# Patient Record
Sex: MALE | Race: WHITE | NOT HISPANIC OR LATINO | Employment: FULL TIME | URBAN - METROPOLITAN AREA
[De-identification: names, ages, dates, MRNs, and addresses within clinical notes are randomized per-mention and may not be internally consistent; named-entity substitution may affect disease eponyms.]

---

## 2023-10-17 PROCEDURE — 80061 LIPID PANEL: CPT

## 2023-10-17 PROCEDURE — 84153 ASSAY OF PSA TOTAL: CPT

## 2023-10-17 PROCEDURE — 81001 URINALYSIS AUTO W/SCOPE: CPT

## 2023-10-17 PROCEDURE — 87086 URINE CULTURE/COLONY COUNT: CPT

## 2023-10-17 PROCEDURE — 84443 ASSAY THYROID STIM HORMONE: CPT

## 2023-10-17 PROCEDURE — 80053 COMPREHEN METABOLIC PANEL: CPT

## 2023-10-17 PROCEDURE — 82306 VITAMIN D 25 HYDROXY: CPT

## 2023-10-17 PROCEDURE — 85027 COMPLETE CBC AUTOMATED: CPT

## 2023-10-17 PROCEDURE — 84550 ASSAY OF BLOOD/URIC ACID: CPT

## 2023-10-17 PROCEDURE — 84439 ASSAY OF FREE THYROXINE: CPT

## 2024-01-04 DIAGNOSIS — J06.9 UPPER RESPIRATORY TRACT INFECTION, UNSPECIFIED TYPE: Primary | ICD-10-CM

## 2024-01-04 RX ORDER — SULFAMETHOXAZOLE AND TRIMETHOPRIM 800; 160 MG/1; MG/1
1 TABLET ORAL 2 TIMES DAILY
Qty: 20 TABLET | Refills: 0 | Status: SHIPPED | OUTPATIENT
Start: 2024-01-04 | End: 2024-01-14

## 2024-01-19 ENCOUNTER — HOSPITAL ENCOUNTER (OUTPATIENT)
Dept: RADIOLOGY | Facility: CLINIC | Age: 64
Discharge: HOME | End: 2024-01-19
Payer: COMMERCIAL

## 2024-01-19 DIAGNOSIS — M54.2 NECK PAIN: ICD-10-CM

## 2024-01-19 PROCEDURE — 72050 X-RAY EXAM NECK SPINE 4/5VWS: CPT | Performed by: RADIOLOGY

## 2024-01-19 PROCEDURE — 72050 X-RAY EXAM NECK SPINE 4/5VWS: CPT

## 2024-05-07 DIAGNOSIS — G47.9 SLEEP DIFFICULTIES: Primary | ICD-10-CM

## 2024-05-07 RX ORDER — ZOLPIDEM TARTRATE 10 MG/1
10 TABLET ORAL NIGHTLY PRN
Qty: 90 TABLET | Refills: 1 | Status: SHIPPED | OUTPATIENT
Start: 2024-05-07

## 2024-05-07 RX ORDER — ZOLPIDEM TARTRATE 10 MG/1
10 TABLET ORAL NIGHTLY PRN
COMMUNITY
End: 2024-05-07 | Stop reason: SDUPTHER

## 2024-05-07 RX ORDER — MELOXICAM 15 MG/1
15 TABLET ORAL DAILY
COMMUNITY

## 2024-05-24 ENCOUNTER — OFFICE VISIT (OUTPATIENT)
Dept: ORTHOPEDIC SURGERY | Facility: CLINIC | Age: 64
End: 2024-05-24
Payer: COMMERCIAL

## 2024-05-24 ENCOUNTER — HOSPITAL ENCOUNTER (OUTPATIENT)
Dept: RADIOLOGY | Facility: CLINIC | Age: 64
Discharge: HOME | End: 2024-05-24
Payer: COMMERCIAL

## 2024-05-24 VITALS — WEIGHT: 223.2 LBS | BODY MASS INDEX: 33.06 KG/M2 | HEIGHT: 69 IN

## 2024-05-24 DIAGNOSIS — G89.29 CHRONIC KNEE PAIN AFTER TOTAL REPLACEMENT OF RIGHT KNEE JOINT: Primary | ICD-10-CM

## 2024-05-24 DIAGNOSIS — M25.561 PAIN AND SWELLING OF RIGHT KNEE: ICD-10-CM

## 2024-05-24 DIAGNOSIS — Z96.651 CHRONIC KNEE PAIN AFTER TOTAL REPLACEMENT OF RIGHT KNEE JOINT: Primary | ICD-10-CM

## 2024-05-24 DIAGNOSIS — M25.561 CHRONIC KNEE PAIN AFTER TOTAL REPLACEMENT OF RIGHT KNEE JOINT: Primary | ICD-10-CM

## 2024-05-24 DIAGNOSIS — Z98.890: ICD-10-CM

## 2024-05-24 DIAGNOSIS — M79.2 NERVE PAIN: ICD-10-CM

## 2024-05-24 DIAGNOSIS — M25.461 PAIN AND SWELLING OF RIGHT KNEE: ICD-10-CM

## 2024-05-24 DIAGNOSIS — M47.812 CERVICAL ARTHRITIS: ICD-10-CM

## 2024-05-24 PROCEDURE — 73562 X-RAY EXAM OF KNEE 3: CPT | Mod: RIGHT SIDE | Performed by: RADIOLOGY

## 2024-05-24 PROCEDURE — 99213 OFFICE O/P EST LOW 20 MIN: CPT | Performed by: ORTHOPAEDIC SURGERY

## 2024-05-24 PROCEDURE — 73562 X-RAY EXAM OF KNEE 3: CPT | Mod: RT

## 2024-05-24 ASSESSMENT — PAIN - FUNCTIONAL ASSESSMENT: PAIN_FUNCTIONAL_ASSESSMENT: 0-10

## 2024-05-24 ASSESSMENT — PAIN SCALES - GENERAL: PAINLEVEL_OUTOF10: 8

## 2024-05-24 NOTE — PROGRESS NOTES
Patient is a 62-year-old gentleman who presents today for evaluation of his right revision total knee replacement.  His surgery was performed at an outside institution.  He originally had his knee replacement and subsequently developed a periprosthetic infection and was treated with a two-stage revision protocol.  He feels like the knee gives out at times and he has constant pain.    Right knee:  AAOx3, NAD, walks with a non-antalgic gait  Neutral allignment  Range of motion 5-100  Stable to varus/valgus/anterior/posterior stress through out the range of motion  No no joint line tenderness to palpation  No effusion  SILT in a [place]/saph/per/tib distribution  5/5 knee extension/df/pf/ehl  ½ dorsalis pedis and posterior tibial pulse  no popliteal lymphadenopathy  no other overlying lesions  mood: euthymic  Respirations non labored  Incision healed    Plain films were read by myself in clinic today.  The stem of revision knee replacement in place with adequate alignment and no signs of loosening or failure.    I discussed today with the patient that there is really nothing I can do further surgically this time.  His clinical exam is actually very benign despite how he feels.  We did put a referral in for pain management to evaluate him for some neck injections as well as possible nerve ablation around his knee.  All of his questions were answered.  He can follow-up with me as needed.    This note was created using voice recognition software and was not corrected for typographical or grammatical errors.

## 2024-06-13 ENCOUNTER — OFFICE VISIT (OUTPATIENT)
Dept: PAIN MEDICINE | Facility: HOSPITAL | Age: 64
End: 2024-06-13
Payer: COMMERCIAL

## 2024-06-13 DIAGNOSIS — M79.2 NERVE PAIN: ICD-10-CM

## 2024-06-13 DIAGNOSIS — Z96.651 CHRONIC KNEE PAIN AFTER TOTAL REPLACEMENT OF RIGHT KNEE JOINT: ICD-10-CM

## 2024-06-13 DIAGNOSIS — M25.561 PAIN AND SWELLING OF RIGHT KNEE: ICD-10-CM

## 2024-06-13 DIAGNOSIS — M54.12 CERVICAL RADICULITIS: ICD-10-CM

## 2024-06-13 DIAGNOSIS — G89.29 CHRONIC KNEE PAIN AFTER TOTAL REPLACEMENT OF RIGHT KNEE JOINT: ICD-10-CM

## 2024-06-13 DIAGNOSIS — M47.812 CERVICAL ARTHRITIS: ICD-10-CM

## 2024-06-13 DIAGNOSIS — M25.561 CHRONIC KNEE PAIN AFTER TOTAL REPLACEMENT OF RIGHT KNEE JOINT: ICD-10-CM

## 2024-06-13 DIAGNOSIS — M25.461 PAIN AND SWELLING OF RIGHT KNEE: ICD-10-CM

## 2024-06-13 PROCEDURE — 99214 OFFICE O/P EST MOD 30 MIN: CPT | Performed by: ANESTHESIOLOGY

## 2024-06-13 ASSESSMENT — PAIN SCALES - GENERAL: PAINLEVEL: 6

## 2024-06-13 NOTE — PROGRESS NOTES
Chief Complaint   Patient presents with    Knee Pain    Neck Pain     History Of Present Illness  Efrain Rios is a 64 y.o. male presenting as a remote follow-up for left-sided neck pain and right knee pain.  Patient says his right knee pain has been much more chronic, right TKA that was complicated by infections and two-stage revision.  He also has been complaining of new left neck pain, that has severely limited his ability to rotate his head to the left or right, which exacerbates his pain.  He denies any radicular symptoms beyond the shoulder blade.  He has no pain in the lower arm or fingertips as well as any paresthesia.     Past Medical History  Past Medical History:   Diagnosis Date    Abnormal findings on diagnostic imaging of heart and coronary circulation     Elevated coronary artery calcium score    Other conditions influencing health status 12/10/2018    Colonoscopy planned    Personal history of other diseases of the respiratory system 09/26/2016    History of acute sinusitis       Surgical History  Past Surgical History:   Procedure Laterality Date    KNEE ARTHROSCOPY W/ DEBRIDEMENT  10/23/2013    Arthroscopy Knee Right    TOTAL KNEE ARTHROPLASTY  10/23/2013    Total Knee Arthroplasty        Social History  He has no history on file for tobacco use, alcohol use, and drug use.    Family History  No family history on file.     Allergies  Patient has no allergy information on record.    Review of Systems   13 point ROS done and negative except for the above.   Physical Exam    PHYSICAL EXAM  Vitals signs reviewed  Constitutional:    General: Not in acute distress   Appearance: Normal appearance. Not ill-appearing.  HENT:   Head: Normocephalic and atraumatic  Eyes:   Conjunctiva/sclera normal  Cardiovascular:  No jugular venous distention bilaterally  No gross edema in lower extremities  Pulmonary:   Effort: No respiratory distress  Abdominal:  Abdomen appears nondistended  Musculoskeletal:   Point of  maximal tenderness of the neck over left cervical paraspinal region  Skin:  Well-healed surgical incision over bilateral knees  Neurological:   General: No focal deficit present  Psychiatric:    Mood and Affect: Mood normal    Behavior: Behavior normal    Last Recorded Vitals  There were no vitals taken for this visit.    Relevant Results        ASSESSMENT:  Assessment/Plan   Problem List Items Addressed This Visit    None  Visit Diagnoses         Codes    Pain and swelling of right knee     M25.561, M25.461    Chronic knee pain after total replacement of right knee joint     M25.561, G89.29, Z96.651    Cervical arthritis     M47.812    Nerve pain     M79.2            Patient is a 62-year-old male who presents as a remote follow-up visit now complaining of left-sided neck pain and right-sided knee pain. C-spine plain film reviewed, notable for multiple osteophyte complexes that appear to be the worst at C5/C6.  MRI from OSH uploaded.  Patient reports that his neck pain has been more acute more bothersome than his knee.  Patient completed 8 weeks of physical therapy and is active in daily at home physician directed exercises.  Has also failed Tylenol and ibuprofen as well as other conservative medications.  Therefore, we will schedule for left-sided cervical medial branch test block, his first.      PLAN:  -Schedule for left-sided bias cervical epidural steroid injection.  Risks, benefits and alternatives of the procedure were discussed with the patient who expressed understanding and agrees to proceed.    -In the future we discussed the potential for a genicular block and subsequent radiofrequency ablation.  Procedure, risk, benefits, alternatives reviewed.    The plan was discussed with the patient and they were invited to contact us back anytime with any questions or concerns and follow-up with us in the office as needed.    Sky Faria MD

## 2024-06-25 ENCOUNTER — APPOINTMENT (OUTPATIENT)
Dept: PAIN MEDICINE | Facility: HOSPITAL | Age: 64
End: 2024-06-25
Payer: COMMERCIAL

## 2024-06-28 ENCOUNTER — HOSPITAL ENCOUNTER (OUTPATIENT)
Dept: RADIOLOGY | Facility: HOSPITAL | Age: 64
Discharge: HOME | End: 2024-06-28
Payer: COMMERCIAL

## 2024-06-28 VITALS
OXYGEN SATURATION: 99 % | DIASTOLIC BLOOD PRESSURE: 91 MMHG | RESPIRATION RATE: 17 BRPM | HEART RATE: 87 BPM | HEIGHT: 70 IN | TEMPERATURE: 98.1 F | WEIGHT: 220 LBS | SYSTOLIC BLOOD PRESSURE: 134 MMHG | BODY MASS INDEX: 31.5 KG/M2

## 2024-06-28 DIAGNOSIS — M54.12 CERVICAL RADICULITIS: ICD-10-CM

## 2024-06-28 DIAGNOSIS — M47.812 CERVICAL ARTHRITIS: ICD-10-CM

## 2024-06-28 PROCEDURE — 62321 NJX INTERLAMINAR CRV/THRC: CPT | Performed by: ANESTHESIOLOGY

## 2024-06-28 PROCEDURE — 2500000004 HC RX 250 GENERAL PHARMACY W/ HCPCS (ALT 636 FOR OP/ED): Performed by: ANESTHESIOLOGY

## 2024-06-28 RX ORDER — MIDAZOLAM HYDROCHLORIDE 1 MG/ML
INJECTION, SOLUTION INTRAMUSCULAR; INTRAVENOUS
Status: COMPLETED | OUTPATIENT
Start: 2024-06-28 | End: 2024-06-28

## 2024-06-28 ASSESSMENT — PAIN - FUNCTIONAL ASSESSMENT
PAIN_FUNCTIONAL_ASSESSMENT: 0-10

## 2024-06-28 ASSESSMENT — PAIN SCALES - GENERAL
PAINLEVEL_OUTOF10: 0 - NO PAIN
PAINLEVEL_OUTOF10: 7
PAINLEVEL_OUTOF10: 6
PAINLEVEL_OUTOF10: 3
PAINLEVEL_OUTOF10: 7

## 2024-06-28 ASSESSMENT — COLUMBIA-SUICIDE SEVERITY RATING SCALE - C-SSRS
1. IN THE PAST MONTH, HAVE YOU WISHED YOU WERE DEAD OR WISHED YOU COULD GO TO SLEEP AND NOT WAKE UP?: NO
2. HAVE YOU ACTUALLY HAD ANY THOUGHTS OF KILLING YOURSELF?: NO
6. HAVE YOU EVER DONE ANYTHING, STARTED TO DO ANYTHING, OR PREPARED TO DO ANYTHING TO END YOUR LIFE?: NO

## 2024-06-28 NOTE — H&P
"HISTORY AND PHYSICAL    History Of Present Illness  Efrain Rios is a 64 y.o. male presenting with chronic pain.  Here for Cervical interlaminar epidural steroid injection    he denies any recent antibiotic use or infections, he denies any blood thinner use , and he denies contrast or local anesthetic allergies     He did take 81mg aspirin yesterday    Past Medical History  Past Medical History:   Diagnosis Date    Abnormal findings on diagnostic imaging of heart and coronary circulation     Elevated coronary artery calcium score    Other conditions influencing health status 12/10/2018    Colonoscopy planned    Personal history of other diseases of the respiratory system 09/26/2016    History of acute sinusitis       Surgical History  Past Surgical History:   Procedure Laterality Date    KNEE ARTHROSCOPY W/ DEBRIDEMENT  10/23/2013    Arthroscopy Knee Right    TOTAL KNEE ARTHROPLASTY  10/23/2013    Total Knee Arthroplasty        Social History  He reports that he has never smoked. He does not have any smokeless tobacco history on file. He reports current alcohol use. He reports current drug use. Drug: Marijuana.    Family History  No family history on file.     Allergies  Patient has no known allergies.    Review of Systems   12 point ROS done and negative except for the above.   Physical Exam     General: NAD, well groomed, well nourished  Eyes: Non-icteric sclera, EOMI  Ears, Nose, Mouth, and Throat: External ears and nose appear to be without deformity or rash. No lesions or masses noted. Hearing is grossly intact.   Neck: Trachea midline  Respiratory: Nonlabored breathing   Cardiovascular: No peripheral edema   Skin: No rashes or open lesions/ulcers identified on skin.    Last Recorded Vitals  Blood pressure 145/85, pulse 74, temperature 36.8 °C (98.2 °F), temperature source Temporal, resp. rate 19, height 1.778 m (5' 10\"), weight 99.8 kg (220 lb), SpO2 98%.    Relevant Results           Assessment/Plan "   Discussed the increased risk of bleeding complications like epidural hematoma with recent aspirin use. Offered to reschedule or proceed with increased risk. Had a shared discussion about the risks and all parties agreed to proceed.     Risks, benefits, alternatives discussed. All questions answered to the best of my ability. Patient agrees to proceed.   -We will proceed with planned procedure        Jah Ya MD  Chronic Pain Fellow  [facility]

## 2024-07-22 ENCOUNTER — LAB REQUISITION (OUTPATIENT)
Dept: LAB | Facility: LAB | Age: 64
End: 2024-07-22
Payer: COMMERCIAL

## 2024-07-22 ENCOUNTER — HOSPITAL ENCOUNTER (OUTPATIENT)
Dept: RADIOLOGY | Facility: CLINIC | Age: 64
Discharge: HOME | End: 2024-07-22
Payer: COMMERCIAL

## 2024-07-22 DIAGNOSIS — M25.551 PAIN OF RIGHT HIP: ICD-10-CM

## 2024-07-22 DIAGNOSIS — E29.1 TESTICULAR HYPOFUNCTION: ICD-10-CM

## 2024-07-22 LAB — TESTOST SERPL-MCNC: 365 NG/DL (ref 240–1000)

## 2024-07-22 PROCEDURE — 84403 ASSAY OF TOTAL TESTOSTERONE: CPT

## 2024-07-22 PROCEDURE — 73502 X-RAY EXAM HIP UNI 2-3 VIEWS: CPT | Mod: RIGHT SIDE | Performed by: RADIOLOGY

## 2024-07-22 PROCEDURE — 73502 X-RAY EXAM HIP UNI 2-3 VIEWS: CPT | Mod: RT

## 2024-07-25 ENCOUNTER — APPOINTMENT (OUTPATIENT)
Dept: ORTHOPEDIC SURGERY | Facility: CLINIC | Age: 64
End: 2024-07-25
Payer: COMMERCIAL

## 2024-08-01 ENCOUNTER — APPOINTMENT (OUTPATIENT)
Dept: ORTHOPEDIC SURGERY | Facility: CLINIC | Age: 64
End: 2024-08-01
Payer: COMMERCIAL

## 2024-08-15 DIAGNOSIS — J40 BRONCHITIS: Primary | ICD-10-CM

## 2024-08-15 RX ORDER — AZITHROMYCIN 250 MG/1
TABLET, FILM COATED ORAL
Qty: 6 TABLET | Refills: 1 | Status: SHIPPED | OUTPATIENT
Start: 2024-08-15 | End: 2024-08-20

## 2024-08-19 ENCOUNTER — OFFICE VISIT (OUTPATIENT)
Dept: ORTHOPEDIC SURGERY | Facility: CLINIC | Age: 64
End: 2024-08-19
Payer: COMMERCIAL

## 2024-08-19 ENCOUNTER — HOSPITAL ENCOUNTER (OUTPATIENT)
Dept: RADIOLOGY | Facility: EXTERNAL LOCATION | Age: 64
Discharge: HOME | End: 2024-08-19

## 2024-08-19 ENCOUNTER — APPOINTMENT (OUTPATIENT)
Dept: ORTHOPEDIC SURGERY | Facility: CLINIC | Age: 64
End: 2024-08-19
Payer: COMMERCIAL

## 2024-08-19 DIAGNOSIS — M16.11 PRIMARY OSTEOARTHRITIS OF RIGHT HIP: Primary | ICD-10-CM

## 2024-08-19 PROCEDURE — 99214 OFFICE O/P EST MOD 30 MIN: CPT | Performed by: EMERGENCY MEDICINE

## 2024-08-19 PROCEDURE — 20611 DRAIN/INJ JOINT/BURSA W/US: CPT | Performed by: EMERGENCY MEDICINE

## 2024-08-19 RX ORDER — LIDOCAINE HYDROCHLORIDE 10 MG/ML
4 INJECTION INFILTRATION; PERINEURAL
Status: COMPLETED | OUTPATIENT
Start: 2024-08-19 | End: 2024-08-19

## 2024-08-19 RX ORDER — METHYLPREDNISOLONE ACETATE 40 MG/ML
80 INJECTION, SUSPENSION INTRA-ARTICULAR; INTRALESIONAL; INTRAMUSCULAR; SOFT TISSUE
Status: COMPLETED | OUTPATIENT
Start: 2024-08-19 | End: 2024-08-19

## 2024-08-19 ASSESSMENT — PAIN DESCRIPTION - DESCRIPTORS: DESCRIPTORS: DULL;ACHING

## 2024-08-19 ASSESSMENT — PAIN SCALES - GENERAL: PAINLEVEL_OUTOF10: 7

## 2024-08-19 ASSESSMENT — PAIN - FUNCTIONAL ASSESSMENT: PAIN_FUNCTIONAL_ASSESSMENT: 0-10

## 2024-08-19 NOTE — PROGRESS NOTES
Subjective    Patient ID: Efrain Rios is a 64 y.o. male.    Chief Complaint: Pain of the Right Hip (NPV - Right hip pain no injury states it's arthritis. )     Last Surgery: No surgery found  Last Surgery Date: No surgery found    Efrain is a very pleasant 64-year-old male coming in with acute on chronic right hip pain.  He has a history of bilateral knee replacements and follows with Dr. Watts but was sent here for a possible hip injection because he is trying to avoid surgery for the right hip.  He has known DJD.  He has pain in the anterior lateral aspect of the right hip that is moderate in severity and worse with activity.  He had a cortisone injection under fluoroscopy 4 years ago at [facility] that worked very well until recently.  He denies any traumatic events or known injuries.  No infectious symptoms.  No other complaints here today.        Objective   Right Hip Exam     Tenderness   The patient is experiencing tenderness in the greater trochanter (Mild tenderness to palpation over the greater trochanter).    Range of Motion   Flexion:  abnormal   External rotation:  abnormal   Internal rotation:  abnormal     Muscle Strength   The patient has normal right hip strength.    Tests   MELECIO: positive    Other   Erythema: absent  Sensation: normal  Pulse: present    Comments:  Positive FADIR      Left Hip Exam   Left hip exam is normal.            Image Results:  Point of Care Ultrasound  These images are not reportable by radiology and will not be interpreted   by  Radiologists.    X-rays of the right hip and pelvis were reviewed and interpreted by me on 8/19/2024 and revealed severe osteoarthritis of the right femoral acetabular joint without any evidence of acute injuries or fractures.    Patient ID: Efrain Rios is a 64 y.o. male.    L Inj/Asp: R hip joint on 8/19/2024 10:28 AM  Indications: pain  Details: 22 G needle, ultrasound-guided anterior approach  Medications: 80 mg  methylPREDNISolone acetate 40 mg/mL; 4 mL lidocaine 10 mg/mL (1 %)  Outcome: tolerated well, no immediate complications  Procedure, treatment alternatives, risks and benefits explained, specific risks discussed. Consent was given by the patient. Immediately prior to procedure a time out was called to verify the correct patient, procedure, equipment, support staff and site/side marked as required. Patient was prepped and draped in the usual sterile fashion.           Assessment/Plan   Encounter Diagnoses:  Primary osteoarthritis of right hip    Orders Placed This Encounter    L Inj/Asp    Point of Care Ultrasound     No follow-ups on file.    We discussed his treatment options and agreed to perform a right hip cortisone injection under ultrasound guidance.  The patient tolerated the procedure well without any complications and activity modifications were reviewed.  He is going to continue over-the-counter pain medications as needed and will follow-up with me as needed moving forward.    ** Please excuse any errors in grammar or translation related to this dictation. Voice recognition software was utilized to prepare this document. **       Vince Andrade MD  [facility]

## 2024-09-24 ENCOUNTER — OFFICE VISIT (OUTPATIENT)
Dept: PAIN MEDICINE | Facility: HOSPITAL | Age: 64
End: 2024-09-24
Payer: COMMERCIAL

## 2024-09-24 DIAGNOSIS — M25.561 CHRONIC KNEE PAIN AFTER TOTAL REPLACEMENT OF RIGHT KNEE JOINT: Primary | ICD-10-CM

## 2024-09-24 DIAGNOSIS — M25.561 CHRONIC PAIN OF RIGHT KNEE: ICD-10-CM

## 2024-09-24 DIAGNOSIS — Z96.651 CHRONIC KNEE PAIN AFTER TOTAL REPLACEMENT OF RIGHT KNEE JOINT: Primary | ICD-10-CM

## 2024-09-24 DIAGNOSIS — G89.29 CHRONIC PAIN OF RIGHT KNEE: ICD-10-CM

## 2024-09-24 DIAGNOSIS — G89.29 CHRONIC KNEE PAIN AFTER TOTAL REPLACEMENT OF RIGHT KNEE JOINT: Primary | ICD-10-CM

## 2024-09-24 PROCEDURE — 99214 OFFICE O/P EST MOD 30 MIN: CPT | Performed by: ANESTHESIOLOGY

## 2024-09-24 NOTE — PROGRESS NOTES
Chief Complaint   Patient presents with    Knee Pain    Hip Pain    Neck Pain     Subjective   HPI: Efrain Rios is a 64 y.o. male with a past medical history of right sided chronic knee pain, who came today complaining of worsening of his pain recently. His right sided knee pain is localized to the joint, he has no posterior knee pain.  He pain is not radiating and he reports that he has worsening of his pain with walking, bending and relieved with rest, meloxicam, and icing. He had a history of bilateral knee replacement and after that he had five surgery on his right knee for revision.  In the past we discussed the potential for genicular block which she did not pursue.  He returns today and is interested in pursuing a genicular nerve block.    The patient does have a number of other pain complaints but his focal area pain at this time is his knee.    Physical Therapy: The patient completed more than six weeks of formal physical therapy more than six months ago, but has done physician-directed exercises at home every day for greater than six weeks with minimal improvement  Other Conservative Measures he has tried: Heating Pad, Ice, and Massage/myofascial release  Classes of medications tried in the past: Acetaminophen and NSAIDs    Review of Systems   13-point ROS done and negative except for HPI.     Current Outpatient Medications   Medication Instructions    meloxicam (MOBIC) 15 mg, oral, Daily    zolpidem (AMBIEN) 10 mg, oral, Nightly PRN       Past Medical History:   Diagnosis Date    Abnormal findings on diagnostic imaging of heart and coronary circulation     Elevated coronary artery calcium score    Other conditions influencing health status 12/10/2018    Colonoscopy planned    Personal history of other diseases of the respiratory system 09/26/2016    History of acute sinusitis        Past Surgical History:   Procedure Laterality Date    KNEE ARTHROSCOPY W/ DEBRIDEMENT  10/23/2013    Arthroscopy Knee Right     TOTAL KNEE ARTHROPLASTY  10/23/2013    Total Knee Arthroplasty        No family history on file.     No Known Allergies     Objective     There were no vitals filed for this visit.     Physical Exam  General: NAD, well groomed, well nourished  Eyes: Non-icteric sclera, EOMI  Ears, Nose, Mouth, and Throat: External ears and nose appear to be without deformity or rash. No lesions or masses noted. Hearing is grossly intact.   Neck: Trachea midline  Respiratory: Nonlabored breathing   Cardiovascular: no peripheral edema   Skin: No rashes or open lesions/ulcers identified on skin.  Fibromyalgia Exam: Non-tender at multiple tender points in accordance with ACR criteria.   MSK: Normal muscle tone and bulk    No pain over greater trochanters. and Pain not reproduced with hip internal/external rotation.     Neurologic:   Cranial nerves grossly intact.   Strength 5/5 and symmetric plantar/dorsiflexion   Sensation: Normal to light touch throughout, and pinprick intact throughout.  DTRs:normal and symmetric throughout  White: absent  Clonus: absent    Psychiatric: Alert, orientation to person, place, and time. Cooperative.    Imaging:  Narrative & Impression   Interpreted By:  Mckenna Brock,   STUDY:  Right knee, 3 views.      INDICATION:  Signs/Symptoms:pain previous TKA      COMPARISON:  10/20/2020.      ACCESSION NUMBER(S):  [id number]      ORDERING CLINICIAN:  JARED CAN      FINDINGS:  No acute fracture or malalignment.  Status post total knee arthroplasty with long-stem femoral component.  Hardware is intact without perihardware fractures or lucencies.  Heterotopic ossifications in the medial and posterior aspect of the  knee joint.      No significant knee joint effusion.  Soft tissues are unremarkable.      IMPRESSION:  1. Right knee arthroplasty without hardware complication.  2. Heterotopic ossification surrounding the knee joint.           Assessment/Plan   64-year-old male with a history of  right-sided knee pain status post 5 surgeries on the joint.  He has seen his orthopedic surgeon earlier this year who has nothing from a surgical standpoint to offer at this time.  He was recommended to pursue conservative measures and pain management.    Plan:  - Schedule for genicular nerve block followed by radiofrequency ablation if diagnostic blocks are helpful.  We discussed the diagnostic nature of the initial injection as well as the procedure itself, risk, benefits, alternatives.    Planned Procedure:    The patient has failed treatment with : Physical therapy , they are not a surgical candidate, and they have failed surgery    We discussed  the risks, benefits and alternatives of the procedure including but not limited to: , Lack of efficacy , Transiently worsening pain , Bleeding, Infection , and Nerve Damage    Follow up: After procedure    The patient was invited to contact us back anytime with any questions or concerns and follow-up with us in the office as needed.         This note was generated with the aid of dictation software, there may be typos despite my attempts at proofreading.

## 2024-10-01 DIAGNOSIS — F32.0 CURRENT MILD EPISODE OF MAJOR DEPRESSIVE DISORDER, UNSPECIFIED WHETHER RECURRENT (CMS-HCC): Primary | ICD-10-CM

## 2024-10-01 RX ORDER — ESCITALOPRAM OXALATE 10 MG/1
10 TABLET ORAL DAILY
COMMUNITY
End: 2024-10-01 | Stop reason: SDUPTHER

## 2024-10-01 RX ORDER — ESCITALOPRAM OXALATE 10 MG/1
10 TABLET ORAL DAILY
Qty: 90 TABLET | Refills: 3 | Status: SHIPPED | OUTPATIENT
Start: 2024-10-01

## 2024-10-14 ENCOUNTER — APPOINTMENT (OUTPATIENT)
Facility: HOSPITAL | Age: 64
End: 2024-10-14
Payer: COMMERCIAL

## 2024-10-14 DIAGNOSIS — R19.7 DIARRHEA IN ADULT PATIENT: Primary | ICD-10-CM

## 2024-10-15 ENCOUNTER — LAB (OUTPATIENT)
Dept: LAB | Facility: LAB | Age: 64
End: 2024-10-15

## 2024-10-15 DIAGNOSIS — R19.7 DIARRHEA IN ADULT PATIENT: ICD-10-CM

## 2024-10-15 PROCEDURE — 87506 IADNA-DNA/RNA PROBE TQ 6-11: CPT

## 2024-10-15 PROCEDURE — 87329 GIARDIA AG IA: CPT

## 2024-10-15 PROCEDURE — 87493 C DIFF AMPLIFIED PROBE: CPT

## 2024-10-15 PROCEDURE — 87328 CRYPTOSPORIDIUM AG IA: CPT

## 2024-10-16 LAB
C COLI+JEJ+UPSA DNA STL QL NAA+PROBE: NOT DETECTED
C DIF TOX TCDA+TCDB STL QL NAA+PROBE: NOT DETECTED
EC STX1 GENE STL QL NAA+PROBE: NOT DETECTED
EC STX2 GENE STL QL NAA+PROBE: NOT DETECTED
NOROVIRUS GI + GII RNA STL NAA+PROBE: NOT DETECTED
RV RNA STL NAA+PROBE: NOT DETECTED
SALMONELLA DNA STL QL NAA+PROBE: NOT DETECTED
SHIGELLA DNA SPEC QL NAA+PROBE: NOT DETECTED
V CHOLERAE DNA STL QL NAA+PROBE: NOT DETECTED
Y ENTEROCOL DNA STL QL NAA+PROBE: NOT DETECTED

## 2024-10-18 LAB
CRYPTOSP AG STL QL IA: NEGATIVE
G LAMBLIA AG STL QL IA: NEGATIVE

## 2024-10-21 LAB — O+P STL MICRO: NEGATIVE

## 2024-10-22 ENCOUNTER — APPOINTMENT (OUTPATIENT)
Dept: PAIN MEDICINE | Facility: HOSPITAL | Age: 64
End: 2024-10-22
Payer: COMMERCIAL

## 2025-03-10 ENCOUNTER — OFFICE VISIT (OUTPATIENT)
Facility: CLINIC | Age: 65
End: 2025-03-10
Payer: COMMERCIAL

## 2025-03-10 VITALS
HEIGHT: 70 IN | BODY MASS INDEX: 32.93 KG/M2 | HEART RATE: 80 BPM | SYSTOLIC BLOOD PRESSURE: 120 MMHG | DIASTOLIC BLOOD PRESSURE: 78 MMHG | TEMPERATURE: 97.9 F | WEIGHT: 230 LBS

## 2025-03-10 DIAGNOSIS — M54.12 CERVICAL RADICULOPATHY AT C6: Primary | ICD-10-CM

## 2025-03-10 DIAGNOSIS — M54.12 CERVICAL RADICULOPATHY: ICD-10-CM

## 2025-03-10 PROCEDURE — 99204 OFFICE O/P NEW MOD 45 MIN: CPT | Performed by: STUDENT IN AN ORGANIZED HEALTH CARE EDUCATION/TRAINING PROGRAM

## 2025-03-10 PROCEDURE — 99214 OFFICE O/P EST MOD 30 MIN: CPT | Performed by: STUDENT IN AN ORGANIZED HEALTH CARE EDUCATION/TRAINING PROGRAM

## 2025-03-10 PROCEDURE — 3008F BODY MASS INDEX DOCD: CPT | Performed by: STUDENT IN AN ORGANIZED HEALTH CARE EDUCATION/TRAINING PROGRAM

## 2025-03-10 RX ORDER — ASPIRIN 81 MG/1
81 TABLET ORAL
COMMUNITY

## 2025-03-10 RX ORDER — ROSUVASTATIN CALCIUM 40 MG/1
1 TABLET, COATED ORAL
COMMUNITY
Start: 2025-02-14

## 2025-03-10 RX ORDER — ATORVASTATIN CALCIUM 40 MG/1
20 TABLET, FILM COATED ORAL
COMMUNITY

## 2025-03-10 RX ORDER — METOPROLOL SUCCINATE 100 MG/1
100 TABLET, EXTENDED RELEASE ORAL DAILY
COMMUNITY

## 2025-03-10 RX ORDER — AMLODIPINE BESYLATE 10 MG/1
1 TABLET ORAL DAILY
COMMUNITY
Start: 2016-10-31

## 2025-03-10 ASSESSMENT — PATIENT HEALTH QUESTIONNAIRE - PHQ9
SUM OF ALL RESPONSES TO PHQ9 QUESTIONS 1 & 2: 0
2. FEELING DOWN, DEPRESSED OR HOPELESS: NOT AT ALL
1. LITTLE INTEREST OR PLEASURE IN DOING THINGS: NOT AT ALL

## 2025-03-10 ASSESSMENT — PAIN SCALES - GENERAL: PAINLEVEL_OUTOF10: 8

## 2025-03-10 NOTE — PROGRESS NOTES
[facility] [place]  Department of Neurological Surgery  New Patient Visit    History of Present Illness:  Efrain Rios is a 65 y.o. year old male who presents to the spine clinic with complaints of a constant aching, throbbing and stabbing pain in his neck left great er than right, nonradiating past his shoulders/traps. Patient has previously tried PT and pain injections in the past, but has not done anything recently. He would like to continue with pain management at this time, instead of surgery if possible. He is willing to do surgery as a last resort. Patient states for about 10 years he had issues with decrease in movement with no pain, until about 14 months ago when he started having pain. States he has had a previous lower back injection that helped with his pain, and did not require another one after that until now.       Prior Spine Surgeries: No    Physical Therapy: Currently in PT  Diabetic: No   Osteoporosis: Yes  Patient's BMI is Body mass index is 33 kg/m².    14/14 systems reviewed and negative other than what is listed in the history of present illness    There is no problem list on file for this patient.    Past Medical History:   Diagnosis Date    Abnormal findings on diagnostic imaging of heart and coronary circulation     Elevated coronary artery calcium score    Other conditions influencing health status 12/10/2018    Colonoscopy planned    Personal history of other diseases of the respiratory system 09/26/2016    History of acute sinusitis     Past Surgical History:   Procedure Laterality Date    KNEE ARTHROSCOPY W/ DEBRIDEMENT  10/23/2013    Arthroscopy Knee Right    TOTAL KNEE ARTHROPLASTY  10/23/2013    Total Knee Arthroplasty     Social History     Tobacco Use    Smoking status: Never    Smokeless tobacco: Not on file   Substance Use Topics    Alcohol use: Yes     Comment: occasionally     family history is not on file.    Current Outpatient Medications:     amLODIPine  (Norvasc) 10 mg tablet, Take 1 tablet (10 mg) by mouth once daily., Disp: , Rfl:     rosuvastatin (Crestor) 40 mg tablet, Take 1 tablet (40 mg) by mouth early in the morning.., Disp: , Rfl:     aspirin 81 mg EC tablet, Take 1 tablet (81 mg) by mouth once daily., Disp: , Rfl:     atorvastatin (Lipitor) 40 mg tablet, Take 0.5 tablets (20 mg) by mouth once daily., Disp: , Rfl:     escitalopram (Lexapro) 10 mg tablet, Take 1 tablet (10 mg) by mouth once daily., Disp: 90 tablet, Rfl: 3    meloxicam (Mobic) 15 mg tablet, Take 1 tablet (15 mg) by mouth once daily., Disp: , Rfl:     metoprolol succinate XL (Toprol-XL) 100 mg 24 hr tablet, Take 1 tablet (100 mg) by mouth once daily., Disp: , Rfl:     tirzepatide 10 mg/0.5 mL pen injector, Inject 10 mg under the skin once a week., Disp: , Rfl:     zolpidem (Ambien) 10 mg tablet, Take 1 tablet (10 mg) by mouth as needed at bedtime for sleep. (Patient not taking: Reported on 3/10/2025), Disp: 90 tablet, Rfl: 1  No Known Allergies    Physical Examination    General: Well developed, awake/alert/oriented x3, no distress, alert and cooperative  Skin: Warm and dry, no lesions, no rashes  ENMT: Mucous membranes moist, no apparent injury, no lesions seen  Head/Neck: Neck Supple, no apparent injury  Respiratory/Thorax: Normal breath sounds with good chest expansion, thorax symmetric  Cardiovascular: No pitting edema, no JVD    Motor Strength: 5/5 Throughout all extremities    Muscle Bulk: Normal and symmetric in all extremities    Posture:   -- Cervical: Normal  -- Thoracic: Normal  -- Lumbar : Normal  Paraspinal muscle spasm/tenderness absent.     Sensation: intact to light touch  Tightness and pain in traps otherwise normal      Results  I personally reviewed and interpreted the imaging results which included CT of cervical spine on 01/19/2024 that showed moderate cervical degenerative change greatest at C5 through C7.  Assessment and Plan:    Efrain Rios is a 65 y.o. year old male  who presents to the spine clinic with with complaints of a constant aching, throbbing and stabbing pain in his neck at the C6 region.     Today, we will continue with pain management for the patient. Patient will continue with PT and we have sent a referral for pain injections. We would like to get a XR today for future surgical planning of ACDF which we discussed with the patient and will hold off surgery for now.     I have reviewed all prior documentation and reviewed the electronic medical record since admission. I have personally have reviewed all advanced imaging not just the reports and used my interpretation as documented as the relevant findings. I have reviewed the risks and benefits of all treatment recommendations listed in this note with the patient and family.       The above clinical summary has been dictated with voice recognition software. It has not been proofread for grammatical errors, typographical mistakes, or other semantic inconsistencies.    Thank you for visiting our office today. It was our pleasure to take part in your healthcare.     Do not hesitate to call with any questions regarding your plan of care after leaving at [phone] M-F 8am-4pm.     To clinicians, thank you very much for this kind referral. It is a privilege to partner with you in the care of your patients. My office would be delighted to assist you with any further consultations or with questions regarding the plan of care outlined. Do not hesitate to call the office or contact me directly.       Sincerely,      Kin Anderson MD, [facility]  Spine , [facility]  Eduardo Martinez Chair in Spinal Neurosurgery  Complex Spine Surgery Fellowship Director   of Neurological Surgery  [facility] [employer]  Phone: [phone]  Fax: [phone]        Scribe Attestation  By signing my name below, IInna Scribe    attest that this documentation has been prepared under the direction and in the presence of Kin Anderson MD.

## 2025-03-11 ENCOUNTER — TELEMEDICINE (OUTPATIENT)
Dept: PAIN MEDICINE | Facility: HOSPITAL | Age: 65
End: 2025-03-11
Payer: COMMERCIAL

## 2025-03-11 DIAGNOSIS — M54.12 CERVICAL RADICULOPATHY: ICD-10-CM

## 2025-03-11 DIAGNOSIS — M54.12 CERVICAL RADICULITIS: Primary | ICD-10-CM

## 2025-03-11 PROCEDURE — 99213 OFFICE O/P EST LOW 20 MIN: CPT | Performed by: ANESTHESIOLOGY

## 2025-03-11 NOTE — PROGRESS NOTES
Chief complaint: Neck pain    HPI   Efrain Rios is a 65-year-old male with a history of neck pain who has previously been seen for the same issue.  The patient underwent a cervical epidural on 6/28/2024 previously which did alleviate some pain but he was sent back by neurosurgery spine to consider another injection before they consider surgical measures.  The patient saw Kin Anderson yesterday, 3/10/2025 and they discussed getting a CT scan, keeping up with pain management, repeating injection, and keeping up with physical therapy and exercise for the neck.  They did discuss the potential for ACDF which could be considered this summer/fall.  Patient is not ready at this time to pursue major cervical spine surgery.    Additionally, we have been discussing doing genicular blocks and radiofrequency ablation however this was denied by the patient's insurance.  The patient plans to follow-up and do an appeal and advocate for his himself through his insurance.    Patient has done prior formal physical therapy and went for his neck from 1/15/2025 through 3/5/2025.    ROS: 13 point review of systems is complete and is negative listed above in HPI    Past Medical History:   Diagnosis Date    Abnormal findings on diagnostic imaging of heart and coronary circulation     Elevated coronary artery calcium score    Other conditions influencing health status 12/10/2018    Colonoscopy planned    Personal history of other diseases of the respiratory system 09/26/2016    History of acute sinusitis       Past Surgical History:   Procedure Laterality Date    KNEE ARTHROSCOPY W/ DEBRIDEMENT  10/23/2013    Arthroscopy Knee Right    TOTAL KNEE ARTHROPLASTY  10/23/2013    Total Knee Arthroplasty       No family history on file.    Social History     Tobacco Use    Smoking status: Never   Substance Use Topics    Alcohol use: Yes     Comment: occasionally    Drug use: Yes     Types: Marijuana     Comment: occasional marijuana       Current  Outpatient Medications on File Prior to Visit   Medication Sig Dispense Refill    amLODIPine (Norvasc) 10 mg tablet Take 1 tablet (10 mg) by mouth once daily.      aspirin 81 mg EC tablet Take 1 tablet (81 mg) by mouth once daily.      atorvastatin (Lipitor) 40 mg tablet Take 0.5 tablets (20 mg) by mouth once daily.      escitalopram (Lexapro) 10 mg tablet Take 1 tablet (10 mg) by mouth once daily. 90 tablet 3    meloxicam (Mobic) 15 mg tablet Take 1 tablet (15 mg) by mouth once daily.      metoprolol succinate XL (Toprol-XL) 100 mg 24 hr tablet Take 1 tablet (100 mg) by mouth once daily.      rosuvastatin (Crestor) 40 mg tablet Take 1 tablet (40 mg) by mouth early in the morning..      tirzepatide 10 mg/0.5 mL pen injector Inject 10 mg under the skin once a week.      zolpidem (Ambien) 10 mg tablet Take 1 tablet (10 mg) by mouth as needed at bedtime for sleep. (Patient not taking: Reported on 3/10/2025) 90 tablet 1     No current facility-administered medications on file prior to visit.        No Known Allergies       Imaging:  Narrative & Impression   Interpreted By:  Jayy Chacon,   STUDY:  XR CERVICAL SPINE COMPLETE 4-5 VIEWS      INDICATION:  Signs/Symptoms:Neck pain.      COMPARISON:  None      ACCESSION NUMBER(S):  [id number]      ORDERING CLINICIAN:  MARCO SAUCEDA      FINDINGS:  Moderate multilevel cervical degenerative change with disc disease  greatest C5 through C7 and facet arthrosis moderate diffuse greatest  on the left at C5-6 and C6-7. Neural foraminal narrowing mostly C5  through C7.      Prevertebral soft tissues normal.      IMPRESSION:  Moderate multilevel cervical degenerative change greatest C5 through  C7           Physical Exam:  Gen.: Pleasant, no acute distress    Impression/Plan:  65-year-old male with a history of neck pain who is considering surgical intervention.  He was sent back by his spine surgeon to consider a cervical epidural and keeping up with conservative measures to  hopefully avoid surgical intervention.  If he does not see relief they are considering surgery summer or fall 2025.  Patient only wants to pursue surgery as an absolute last resort.  He would like to exhaust all conservative measures.    -Encouraged to keep up with exercises and stretches for the neck.  Patient just recently finished a full course of physical therapy which he did from mid January through early March.    -Discussed the potential for a cervical epidural steroid injection.  Procedure, risk, benefits, alternatives reviewed.    -Patient to follow-up with his insurance about genicular block.

## 2025-03-20 ENCOUNTER — HOSPITAL ENCOUNTER (OUTPATIENT)
Dept: RADIOLOGY | Facility: HOSPITAL | Age: 65
Discharge: HOME | End: 2025-03-20
Payer: COMMERCIAL

## 2025-03-20 DIAGNOSIS — M54.12 CERVICAL RADICULOPATHY AT C6: ICD-10-CM

## 2025-03-20 PROCEDURE — 72125 CT NECK SPINE W/O DYE: CPT

## 2025-04-07 ENCOUNTER — HOSPITAL ENCOUNTER (OUTPATIENT)
Facility: HOSPITAL | Age: 65
Discharge: HOME | End: 2025-04-07
Payer: COMMERCIAL

## 2025-04-07 VITALS
DIASTOLIC BLOOD PRESSURE: 95 MMHG | HEART RATE: 80 BPM | RESPIRATION RATE: 18 BRPM | OXYGEN SATURATION: 96 % | SYSTOLIC BLOOD PRESSURE: 149 MMHG | TEMPERATURE: 98.8 F

## 2025-04-07 DIAGNOSIS — M54.12 CERVICAL RADICULOPATHY: ICD-10-CM

## 2025-04-07 PROCEDURE — 62321 NJX INTERLAMINAR CRV/THRC: CPT | Performed by: ANESTHESIOLOGY

## 2025-04-07 PROCEDURE — 2500000004 HC RX 250 GENERAL PHARMACY W/ HCPCS (ALT 636 FOR OP/ED): Performed by: ANESTHESIOLOGY

## 2025-04-07 PROCEDURE — 2550000001 HC RX 255 CONTRASTS: Performed by: ANESTHESIOLOGY

## 2025-04-07 RX ORDER — MIDAZOLAM HYDROCHLORIDE 1 MG/ML
INJECTION, SOLUTION INTRAMUSCULAR; INTRAVENOUS
Status: COMPLETED | OUTPATIENT
Start: 2025-04-07 | End: 2025-04-07

## 2025-04-07 RX ORDER — METHYLPREDNISOLONE ACETATE 40 MG/ML
INJECTION, SUSPENSION INTRA-ARTICULAR; INTRALESIONAL; INTRAMUSCULAR; SOFT TISSUE
Status: COMPLETED | OUTPATIENT
Start: 2025-04-07 | End: 2025-04-07

## 2025-04-07 RX ORDER — LIDOCAINE HYDROCHLORIDE 5 MG/ML
INJECTION, SOLUTION INFILTRATION; INTRAVENOUS
Status: COMPLETED | OUTPATIENT
Start: 2025-04-07 | End: 2025-04-07

## 2025-04-07 RX ADMIN — IOHEXOL 2 ML: 240 INJECTION, SOLUTION INTRATHECAL; INTRAVASCULAR; INTRAVENOUS; ORAL at 09:18

## 2025-04-07 RX ADMIN — LIDOCAINE HYDROCHLORIDE 4 ML: 5 INJECTION, SOLUTION INFILTRATION at 09:18

## 2025-04-07 RX ADMIN — METHYLPREDNISOLONE ACETATE 40 MG: 40 INJECTION, SUSPENSION INTRA-ARTICULAR; INTRALESIONAL; INTRAMUSCULAR; SOFT TISSUE at 09:18

## 2025-04-07 RX ADMIN — MIDAZOLAM 2 MG: 1 INJECTION INTRAMUSCULAR; INTRAVENOUS at 09:10

## 2025-04-07 ASSESSMENT — PAIN - FUNCTIONAL ASSESSMENT
PAIN_FUNCTIONAL_ASSESSMENT: 0-10
PAIN_FUNCTIONAL_ASSESSMENT: WONG-BAKER FACES
PAIN_FUNCTIONAL_ASSESSMENT: WONG-BAKER FACES
PAIN_FUNCTIONAL_ASSESSMENT: 0-10
PAIN_FUNCTIONAL_ASSESSMENT: 0-10

## 2025-04-07 ASSESSMENT — PAIN SCALES - GENERAL
PAINLEVEL_OUTOF10: 0 - NO PAIN
PAINLEVEL_OUTOF10: 7
PAINLEVEL_OUTOF10: 7
PAINLEVEL_OUTOF10: 5 - MODERATE PAIN
PAINLEVEL_OUTOF10: 0 - NO PAIN

## 2025-04-07 NOTE — DISCHARGE INSTRUCTIONS
DISCHARGE INSTRUCTIONS FOR INJECTIONS     You underwent cervical epidural steroid injection today    After most injections, it is recommended that you relax and limit your activity for the remainder of the day unless you have been told otherwise by your pain physician.  You should not drive a car, operate machinery, or make important legal decisions unless otherwise directed by your pain physician.  You may resume your normal activity, including exercise, tomorrow.      Keep a written pain diary of how much pain relief you experienced following the injection procedure and the length of time of pain relief you experienced pain relief. Following diagnostic injections like medial branch nerve blocks, sacroiliac joint blocks, stellate ganglion injections and other blocks, it is very important you record the specific amount of pain relief you experienced immediately after the injectionand how long it lasted. Your doctor will ask you for this information at your follow up visit.     For all injections, please keep the injection site dry and inspect the site for a couple of days. You may remove the Band-Aid the day of the injection at any time.     Some discomfort, bruising or slight swelling may occur at the injection site. This is not abnormal if it occurs.  If needed you may:    -Take over the counter medication such as Tylenol or Motrin.   -Apply an ice pack for 30 minutes, 2 to 3 times a day for the first 24 hours.     You may shower today; no soaking baths, hot tubs, whirlpools or swimming pools for two days.      If you are given steroids in your injection, it may take 3-5 days for the steroid medication to take effect. You may notice a worsening of your symptoms for 1-2 days after the injection. This is not abnormal.  You may use acetaminophen, ibuprofen, or prescription medication that your doctor may have prescribed for you if you need to do so.     A few common side effects of steroids include facial flushing,  sweating, restlessness, irritability,difficulty sleeping, increase in blood sugar, and increased blood pressure. If you have diabetes, please monitor your blood sugar at least once a day for at least 5 days. If you have poorly controlled high blood pressure, monitoryour blood pressure for at least 2 days and contact your primary care physician if these numbers are unusually high for you.      If you take aspirin or non-steroidal anti-inflammatory drugs (examples are Motrin, Advil, ibuprofen, Naprosyn, Voltaren, Relafen, etc.) you may restart these this evening, but stop taking it 3 days before your next appointment, unless instructed otherwiseby your physician.      You do not need to discontinue non-aspirin-containing pain medications prior to an injection (examples: Celebrex, tramadol, hydrocodone and acetaminophen).      If you take a blood thinning medication (Coumadin, Lovenox, Fragmin,Ticlid, Plavix, Pradaxa, etc.), please discuss this with your primary care physician/cardiologist and your pain physician. These medications MUST be discontinued before you can have an injection safely, without the risk of uncontrolled bleeding. If these medications are not discontinued for an appropriate period of time, you will not be able to receivean injection. Please adhere to instructions given to you about when to restart your blood thinning medication. If you have any questions please reach out to our team.    If you are taking Coumadin, please have your INR checked the morning of your procedure and bring the result to your appointment unless otherwise instructed. If your INR is over 1.2, your injection may need to be rescheduled to avoid uncontrolled bleeding from the needle placement.     Call [facility]  and ask for Pain Management at [phone] between 8am-4pm Monday - Friday if you are experiencing the following:    If you received an epidural or spinal injection:    -Headache that doesnot go away with medicine, is  worse when sitting or standing up, and is greatly relieved upon lying down.   -Severe pain worse than or different than your baseline pain.   -Chills or fever (101º F or greater).   -Drainage or signs of infection at the injection site     Go directly to the Emergency Department if you are experiencing the following and received an epidural or spinal injection:   -Abrupt weakness or progressive weakness in your legs that starts after you leave the clinic.   -Abrupt severe or worsening numbness in your legs.   -Inability to urinate after the injection or loss of bowel or bladder control without the urge to defecate or urinate.     If you have a clinical question that cannot wait until your next appointment, please call [phone] between 8am-4pm Monday - Friday or send a General Assembly message. We do our best to return all non-emergency messages within 24 hours, Monday - Friday. A nurse or physician will return your message. You may also try calling Dr. Ady Judd's nurse ([phone]) and they will do their best to answer your question(s).    If you need to cancel an appointment, please call the scheduling staff at [phone] during normal business hours or leave a message at least 24 hours in advance.     If you are going to be sedated for your next procedure, you MUST have responsible adult who can legally drive accompany you home. You cannot eat or drink for at least eight hours prior to the planned procedure if you are going to receive sedation. You may take your non-blood thinning medications with a small sip of water.

## 2025-04-07 NOTE — H&P
Pain Management H&P    History Of Present Illness  Efrain Rios is a 65 y.o. male presents for procedure state below. Endorses no changes in past medical history or medical health since last seen in clinic.      Past Medical History  He has a past medical history of Abnormal findings on diagnostic imaging of heart and coronary circulation, Other conditions influencing health status (12/10/2018), and Personal history of other diseases of the respiratory system (09/26/2016).    Surgical History  He has a past surgical history that includes Knee arthroscopy w/ debridement (10/23/2013) and Total knee arthroplasty (10/23/2013).     Social History  He reports that he has never smoked. He does not have any smokeless tobacco history on file. He reports current alcohol use. He reports current drug use. Drug: Marijuana.    Family History  No family history on file.     Allergies  Patient has no known allergies.    Review of Symptoms:   Constitutional: Negative for chills, diaphoresis or fever  HENT: Negative for neck swelling  Eyes:.  Negative for eye pain  Respiratory:.  Negative for cough, shortness of breath or wheezing    Cardiovascular:.  Negative for chest pain or palpitations  Gastrointestinal:.  Negative for abdominal pain, nausea and vomiting  Genitourinary:.  Negative for urgency  Musculoskeletal:  Positive for neck pain. Positive for joint pain. Denies falls within the past 3 months.  Skin: Negative for wounds or itching   Neurological: Negative for dizziness, seizures, loss of consciousness and weakness  Endo/Heme/Allergies: Does not bruise/bleed easily  Psychiatric/Behavioral: Negative for depression. The patient does not appear anxious.      Pre-sedation Evaluation  ASA class 2  Mallampati score 2     PHYSICAL EXAM  Vitals signs reviewed  Constitutional:       General: Not in acute distress     Appearance: Normal appearance. Not ill-appearing.  HENT:     Head: Normocephalic and atraumatic  Eyes:      Conjunctiva/sclera: Conjunctivae normal  Cardiovascular:     Rate and Rhythm: Normal rate and regular rhythm  Pulmonary:     Effort: No respiratory distress  Abdominal:     Palpations: Abdomen is soft  Musculoskeletal: ALICIA  Skin:     General: Skin is warm and dry  Neurological:     General: No focal deficit present  Psychiatric:         Mood and Affect: Mood normal         Behavior: Behavior normal     Last Recorded Vitals  There were no vitals taken for this visit.    Relevant Results  Current Outpatient Medications   Medication Instructions    amLODIPine (Norvasc) 10 mg tablet 1 tablet, Daily    aspirin 81 mg, oral, Daily RT    atorvastatin (LIPITOR) 20 mg, oral, Daily RT    escitalopram (LEXAPRO) 10 mg, oral, Daily    meloxicam (MOBIC) 15 mg, oral, Daily    metoprolol succinate XL (TOPROL-XL) 100 mg, oral, Daily    rosuvastatin (Crestor) 40 mg tablet 1 tablet, Daily (0630)    tirzepatide 10 mg, subcutaneous, Weekly    zolpidem (AMBIEN) 10 mg, oral, Nightly PRN         XR hip right with pelvis when performed 2 or 3 views 07/22/2024    Narrative  Interpreted By:  Mckenna Brock,  STUDY:  Single view pelvis.  Right hip, two views.    INDICATION:  Signs/Symptoms:Right Hip Pain.    COMPARISON:  None.    ACCESSION NUMBER(S):  [id number]    ORDERING CLINICIAN:  MARCO SAUCEDA    FINDINGS:  No acute fracture or malalignment.  Severe right hip osteoarthrosis with joint space loss and osteophytes.  Left hip joint space is maintained.  Bilateral femoral artery vascular calcifications.    Impression  1. Severe right hip osteoarthrosis.    MACRO:  None.    Signed by: Mckenna Brock 7/22/2024 3:02 PM  Dictation workstation:   [id number]     No image results found.       1. Cervical radiculopathy  FL pain management    FL pain management    Epidural Steroid Injection    Epidural Steroid Injection           ASSESSMENT/PLAN  Efrain Rios is a 65 y.o. male here for C6-7 NATALY under fluoroscopy    Patient denies any recent  antibiotic use or infections, denies any blood thinner use, and denies contrast or local anesthetic allergies     Risks, benefits, alternatives discussed. All questions answered to the best of my ability. Patient agrees to proceed.      Our plan is as follows:  - Proceed with aforementioned procedure          DO GRISELDA Fam Pain fellow